# Patient Record
Sex: MALE
[De-identification: names, ages, dates, MRNs, and addresses within clinical notes are randomized per-mention and may not be internally consistent; named-entity substitution may affect disease eponyms.]

---

## 2022-08-29 ENCOUNTER — HOSPITAL ENCOUNTER (EMERGENCY)
Dept: HOSPITAL 97 - ER | Age: 67
Discharge: HOME | End: 2022-08-29
Payer: COMMERCIAL

## 2022-08-29 VITALS — DIASTOLIC BLOOD PRESSURE: 89 MMHG | OXYGEN SATURATION: 98 % | SYSTOLIC BLOOD PRESSURE: 132 MMHG

## 2022-08-29 VITALS — TEMPERATURE: 97.8 F

## 2022-08-29 DIAGNOSIS — R07.89: Primary | ICD-10-CM

## 2022-08-29 DIAGNOSIS — I10: ICD-10-CM

## 2022-08-29 DIAGNOSIS — F17.210: ICD-10-CM

## 2022-08-29 DIAGNOSIS — E11.9: ICD-10-CM

## 2022-08-29 LAB
ALBUMIN SERPL BCP-MCNC: 3.5 G/DL (ref 3.4–5)
ALP SERPL-CCNC: 93 U/L (ref 45–117)
ALT SERPL W P-5'-P-CCNC: 31 U/L (ref 12–78)
AST SERPL W P-5'-P-CCNC: 17 U/L (ref 15–37)
BUN BLD-MCNC: 16 MG/DL (ref 7–18)
GLUCOSE SERPLBLD-MCNC: 147 MG/DL (ref 74–106)
HCT VFR BLD CALC: 45.8 % (ref 39.6–49)
LIPASE SERPL-CCNC: 113 U/L (ref 73–393)
LYMPHOCYTES # SPEC AUTO: 1.9 K/UL (ref 0.7–4.9)
MCV RBC: 88 FL (ref 80–100)
PMV BLD: 7.6 FL (ref 7.6–11.3)
POTASSIUM SERPL-SCNC: 4 MMOL/L (ref 3.5–5.1)
RBC # BLD: 5.21 M/UL (ref 4.33–5.43)

## 2022-08-29 PROCEDURE — 83690 ASSAY OF LIPASE: CPT

## 2022-08-29 PROCEDURE — 36415 COLL VENOUS BLD VENIPUNCTURE: CPT

## 2022-08-29 PROCEDURE — 81015 MICROSCOPIC EXAM OF URINE: CPT

## 2022-08-29 PROCEDURE — 76377 3D RENDER W/INTRP POSTPROCES: CPT

## 2022-08-29 PROCEDURE — 80053 COMPREHEN METABOLIC PANEL: CPT

## 2022-08-29 PROCEDURE — 81003 URINALYSIS AUTO W/O SCOPE: CPT

## 2022-08-29 PROCEDURE — 74176 CT ABD & PELVIS W/O CONTRAST: CPT

## 2022-08-29 PROCEDURE — 85025 COMPLETE CBC W/AUTO DIFF WBC: CPT

## 2022-08-29 PROCEDURE — 99285 EMERGENCY DEPT VISIT HI MDM: CPT

## 2022-08-29 PROCEDURE — 96375 TX/PRO/DX INJ NEW DRUG ADDON: CPT

## 2022-08-29 PROCEDURE — 96374 THER/PROPH/DIAG INJ IV PUSH: CPT

## 2022-08-29 NOTE — RAD REPORT
EXAM DESCRIPTION:  CT - Stone Protocol - 8/29/2022 6:56 pm

 

CLINICAL HISTORY:  right flank pain

 

COMPARISON:  <Comparisons>

 

TECHNIQUE:  Axial 3 mm thick images were obtained without oral or IV contrast. The field-of-view span
s the entirety of the  system including uppermost abdomen and lung bases.

 

All CT scans are performed using dose optimization technique as appropriate and may include automated
 exposure control or mA/KV adjustment according to patient size.

 

FINDINGS:  No hydronephrosis is present and no obstructing ureteral calculi. 1-2 mm sized calculi are
 seen lower pole right kidney. No suspicious renal masses. Isodense masses and pyelonephritis are not
 excluded on a stone protocol CT scan. No significant adrenal finding. No urinary bladder suspicious 
finding.

 

Imaged portions of the liver, spleen and pancreas show no suspicious findings on non-contrast imaging
. No gallbladder or biliary tree abnormality identified.

 

No suspicious bowel findings.

 

No mass or bulky lymphadenopathy. Fat extends into the left inguinal canal. Periumbilical fat only he
rnia present. No free air, free fluid or inflammatory stranding.

 

No significant bony abnormality.

 

IMPRESSION:  No hydronephrosis, obstructing calculus or acute  finding identifiable.

 

Isodense masses and pyelonephritis are not excluded on stone protocol technique.

## 2022-08-29 NOTE — EDPHYS
Physician Documentation                                                                           

 [facility]                                                                 

Name: Andrew Sewell                                                                               

Age: 67 yrs                                                                                       

Sex: Male                                                                                         

: 1955                                                                                   

MRN: [id number]                                                                                   

Arrival Date: 2022                                                                          

Time: 17:40                                                                                       

Account#: [id number]                                                                            

Bed 10                                                                                            

Private MD:                                                                                       

ED Physician Eduardo Salomon                                                                         

HPI:                                                                                              

                                                                                             

18:30 This 67 yrs old  Male presents to ER via Ambulatory with complaints of Flank    cp  

      Pain.                                                                                       

18:30 The patient complains of pain in the right flank. The pain does not radiate. Onset: The cp  

      symptoms/episode began/occurred 2 day(s) ago. Modifying factors: the symptoms are           

      aggravated by movement, palpation/percussion. Associated signs and symptoms: The            

      patient has no apparent associated signs or symptoms. Severity of pain: in the              

      emergency department the pain is unchanged despite home interventions.                      

                                                                                                  

Historical:                                                                                       

- Allergies:                                                                                      

18:04 No Known Allergies;                                                                     aa5 

- PMHx:                                                                                           

18:04 Hypertensive disorder; Diabetes mellitus; thyroid problems;                             aa5 

- PSHx:                                                                                           

18:04 None;                                                                                   aa5 

                                                                                                  

- Immunization history:: Adult Immunizations unknown.                                             

- Social history:: Smoking status: Patient reports the use of cigarette tobacco                   

  products, smokes one-half pack cigarettes per day.                                              

                                                                                                  

                                                                                                  

ROS:                                                                                              

18:35 Constitutional: Negative for body aches, chills, fever, poor PO intake.                 cp  

18:35 Eyes: Negative for injury, pain, redness, and discharge.                                cp  

18:35 ENT: Negative for drainage from ear(s), ear pain, sore throat, difficulty swallowing,       

      difficulty handling secretions.                                                             

18:35 Cardiovascular: Negative for chest pain, edema, palpitations.                               

18:35 Respiratory: Negative for cough, shortness of breath, wheezing.                             

18:35 Abdomen/GI: Negative for vomiting, diarrhea, constipation.                                  

18:35 Back: Positive for flank pain, on the right, Negative for injury or acute deformity,        

      decreased range of motion.                                                                  

18:35 : Negative for urinary symptoms, testicular pain                                          

18:35 Skin: Negative for cellulitis, rash.                                                        

18:35 Neuro: Negative for altered mental status, headache, weakness.                              

18:35 All other systems are negative.                                                             

                                                                                                  

Exam:                                                                                             

18:40 Constitutional: The patient appears in no acute distress, alert, awake,                 cp  

      non-diaphoretic, non-toxic, well developed, well nourished.                                 

18:40 Head/Face:  Normocephalic, atraumatic.                                                  cp  

18:40 Eyes: Periorbital structures: appear normal, Conjunctiva: normal, no exudate, no            

      injection, Sclera: no appreciated abnormality, Lids and lashes: appear normal,              

      bilaterally.                                                                                

18:40 ENT: External ear(s): are unremarkable, Nose: is normal, Mouth: Lips: moist, Oral           

      mucosa: moist, Posterior pharynx: Airway: no evidence of obstruction, patent.               

18:40 Neck: ROM/movement: is normal, is supple, without pain, no range of motions                 

      limitations, no nuchal rigidity.                                                            

18:40 Chest/axilla: Inspection: normal, Palpation: crepitus, is not appreciated, tenderness,      

      that is moderate, of the  right lower lateral chest.                                        

18:40 Cardiovascular: Rate: normal, Edema: is not appreciated, JVD: is not appreciated.           

18:40 Respiratory: the patient does not display signs of respiratory distress,  Respirations:     

      normal, no use of accessory muscles, no retractions, labored breathing, is not present,     

      Breath sounds: are clear throughout, no decreased breath sounds, no stridor, no             

      wheezing.                                                                                   

18:40 Abdomen/GI: Inspection: obese Bowel sounds: active, all quadrants, Palpation: abdomen       

      is soft and non-tender, in all quadrants.                                                   

18:40 Back: CVA tenderness, is absent, vertebral tenderness, is not appreciated.                  

18:40 Skin: cellulitis, is not appreciated, no rash present.                                      

18:40 Neuro: Orientation: to person, place \T\ time. Mentation: is normal, Motor: moves all       

      fours, strength is normal, Sensation: is normal.                                            

                                                                                                  

Vital Signs:                                                                                      

18:05  / 92; Pulse 58; Resp 16 S; Temp 97.8(TE); Pulse Ox 100% on R/A;                  aa5 

19:39  / 90; Pulse 53; Resp 18; Pulse Ox 99% on R/A; Pain 8/10;                         ld1 

20:20  / 89; Pulse 55; Resp 18; Pulse Ox 98% on R/A; Pain 4/10;                         ld1 

                                                                                                  

MDM:                                                                                              

18:18 Patient medically screened.                                                             cp  

20:07 ED course: Patient reports similar pain in the past when diagnosed with shingles. Would cp  

      like to start antivirals.                                                                   

20:12 Data reviewed: vital signs, nurses notes, lab test result(s), radiologic studies, CT    cp  

      scan.                                                                                       

20:12 Counseling: I had a detailed discussion with the patient and/or guardian regarding: the cp  

      historical points, exam findings, and any diagnostic results supporting the                 

      discharge/admit diagnosis, lab results, radiology results, the need for outpatient          

      follow up, a family practitioner, to return to the emergency department if symptoms         

      worsen or persist or if there are any questions or concerns that arise at home.             

      Response to treatment: the patient's symptoms have mildly improved after treatment, and     

      as a result, I will discharge patient.                                                      

                                                                                                  

                                                                                             

18:23 Order name: CBC with Diff; Complete Time: 19:56                                           

                                                                                             

19:56 Interpretation: Reviewed.                                                                 

                                                                                             

18:23 Order name: CMP; Complete Time: 19:56                                                   cp  

                                                                                             

19:56 Interpretation: Normal except: ; GLUC 147.                                          

                                                                                             

18:23 Order name: Lipase; Complete Time: 19:56                                                  

                                                                                             

18:23 Order name: Urine Microscopic Only                                                        

                                                                                             

18:23 Order name: CT Stone Protocol; Complete Time: 19:56                                       

                                                                                             

19:57 Interpretation: Report reviewed.                                                          

                                                                                             

20:23 Order name: Urine Dipstick-Ancillary                                                    EDMS

                                                                                             

18:23 Order name: IV Saline Lock; Complete Time: 18:52                                        cp  

                                                                                             

18:23 Order name: Labs collected and sent; Complete Time: 18:52                                 

                                                                                             

18:23 Order name: Urine Dipstick-Ancillary (obtain specimen); Complete Time: 20:25            cp  

                                                                                                  

Administered Medications:                                                                         

18:52 Not Given (Patient Refused): Zofran (Ondansetron) 4 mg IVP once; over 2 minutes         kb3 

19:35 CANCELLED (Physician Discretion): Ketorolac 15 mg IVP once                              cp  

19:47 Drug: fentaNYL (PF) 25 mcg Route: IVP; Site: right antecubital;                         ld1 

19:47 Follow up: Response: No adverse reaction                                                ld1 

20:19 Drug: Lidoderm Patch 5 % (700 mg/patch) 1 patches Route: Topical; Site: affected area;  ld1 

20:19 Follow up: Response: No adverse reaction                                                ld1 

20:19 Follow up: Response: No adverse reaction                                                ld1 

20:19 Drug: Ketorolac 15 mg Route: IVP; Site: right antecubital;                              ld1 

20:19 Follow up: Response: No adverse reaction                                                ld1 

                                                                                                  

                                                                                                  

Disposition Summary:                                                                              

22 20:12                                                                                    

Discharge Ordered                                                                                 

      Location: Home                                                                          cp  

      Problem: new                                                                            cp  

      Symptoms: have improved                                                                 cp  

      Condition: Stable                                                                       cp  

      Diagnosis                                                                                   

        - Chest pain, unspecified - right lower lateral chest                                 cp  

      Followup:                                                                               cp  

        - With: Private Physician                                                                  

        - When: 2 - 3 days                                                                         

        - Reason: Recheck today's complaints                                                       

      Discharge Instructions:                                                                     

        - Discharge Summary Sheet                                                             cp  

        - Musculoskeletal Pain                                                                cp  

      Forms:                                                                                      

        - Medication Reconciliation Form                                                      cp  

        - Thank You Letter                                                                    cp  

        - Antibiotic Education                                                                cp  

        - Prescription Opioid Use                                                             cp  

      Prescriptions:                                                                              

        - capsaicin 0.075 % Topical cream                                                          

            - apply 1 application by TOPICAL route 4 times per day; 45 gram; Refills: 0,      cp  

      Product Selection Permitted                                                                 

        - Cyclobenzaprine 10 mg Oral Tablet                                                        

            - take 1 tablet by ORAL route every 8 hours As needed; 20 tablet; Refills: 0,     cp  

      Product Selection Permitted                                                                 

        - Acyclovir 800 mg Oral Tablet                                                             

            - take 1 tablet by ORAL route 5 times per day for 10 days; 50 tablet; Refills: 0, cp  

      Product Selection Permitted                                                                 

        - Diclofenac Sodium 75 mg Oral tablet,delayed release (DR/EC)                              

            - take 1 tablet by ORAL route 2 times per day; 20 tablet; Refills: 0, Product     cp  

      Selection Permitted                                                                         

Addendum:                                                                                         

2022                                                                                        

     23:59 Co-signature as Attending Physician, Eduardo Salomon MD I was immediately available on-site r
n

           in the Emergency Department for consultation in the care of the patient..              

                                                                                                  

Signatures:                                                                                       

Dispatcher [employer]                           EDEduardo Hansen MD MD rn Calderon, Audri, RN                     RN   aa5                                                  

Jeevan Hill PA                         PA   cp                                                   

Kelly Lynn RN                     RN   ld1                                                  

Tiffanie Ordonez RN   kb3                                                  

                                                                                                  

Corrections: (The following items were deleted from the chart)                                    

                                                                                             

19:35 19:35 Ketorolac 15 mg IVP once ordered. cp                                              cp  

                                                                                                  

**************************************************************************************************

## 2022-08-29 NOTE — ER
Nurse's Notes                                                                                     

 [facility]                                                                 

Name: Andrew Sewell                                                                               

Age: 67 yrs                                                                                       

Sex: Male                                                                                         

: 1955                                                                                   

MRN: [id number]                                                                                   

Arrival Date: 2022                                                                          

Time: 17:40                                                                                       

Account#: [id number]                                                                            

Bed 10                                                                                            

Private MD:                                                                                       

Diagnosis: Chest pain, unspecified-right lower lateral chest                                      

                                                                                                  

Presentation:                                                                                     

                                                                                             

18:05 Chief complaint: Patient states: right flank pain x 1 week ago. Denies                  aa5 

      nausea/vomiting/diarrhea, denies urinary symptoms. Coronavirus screen: At this time,        

      the client does not indicate any symptoms associated with coronavirus-19. Ebola Screen:     

      Patient denies travel to an Ebola-affected area in the 21 days before illness onset.        

      Initial Sepsis Screen: Does the patient meet any 2 criteria? No. Patient's initial          

      sepsis screen is negative. Does the patient have a suspected source of infection? No.       

      Patient's initial sepsis screen is negative. Risk Assessment: Do you want to hurt           

      yourself or someone else? Patient reports no desire to harm self or others. Onset of        

      symptoms was 2022.                                                                   

18:05 Method Of Arrival: Ambulatory                                                           aa5 

18:05 Acuity: BRIANNA 3                                                                           aa5 

                                                                                                  

Historical:                                                                                       

- Allergies:                                                                                      

18:04 No Known Allergies;                                                                     aa5 

- PMHx:                                                                                           

18:04 Hypertensive disorder; Diabetes mellitus; thyroid problems;                             aa5 

- PSHx:                                                                                           

18:04 None;                                                                                   aa5 

                                                                                                  

- Immunization history:: Adult Immunizations unknown.                                             

- Social history:: Smoking status: Patient reports the use of cigarette tobacco                   

  products, smokes one-half pack cigarettes per day.                                              

                                                                                                  

                                                                                                  

Screenin:39 Abuse screen: Denies threats or abuse. Denies injuries from another. Nutritional        ld1 

      screening: No deficits noted. Tuberculosis screening: No symptoms or risk factors           

      identified. Fall Risk None identified.                                                      

                                                                                                  

Assessment:                                                                                       

19:39 General: Appears in no apparent distress. uncomfortable, Behavior is calm, cooperative, ld1 

      appropriate for age. Pain: Complains of pain in anterior aspect of right lateral            

      abdomen Pain does not radiate. Pain currently is 8 out of 10 on a pain scale. Quality       

      of pain is described as throbbing, Pain began suddenly. Neuro: Level of Consciousness       

      is awake, alert, obeys commands, Oriented to person, place, time, situation.                

      Cardiovascular: Capillary refill < 3 seconds Patient's skin is warm and dry.                

      Respiratory: Airway is patent Respiratory effort is even, unlabored. GI: Abdomen is         

      flat, non-distended. : No signs and/or symptoms were reported regarding the               

      genitourinary system. EENT: No signs and/or symptoms were reported regarding the EENT       

      system. Derm: No signs and/or symptoms reported regarding the dermatologic system.          

      Musculoskeletal: No signs and/or symptoms reported regarding the musculoskeletal system.    

20:20 Reassessment: Patient appears in no apparent distress at this time. Patient and/or      ld1 

      family updated on plan of care and expected duration. Pain level reassessed.                

                                                                                                  

Vital Signs:                                                                                      

18:05  / 92; Pulse 58; Resp 16 S; Temp 97.8(TE); Pulse Ox 100% on R/A;                  aa5 

19:39  / 90; Pulse 53; Resp 18; Pulse Ox 99% on R/A; Pain 8/10;                         ld1 

20:20  / 89; Pulse 55; Resp 18; Pulse Ox 98% on R/A; Pain 4/10;                         ld1 

                                                                                                  

ED Course:                                                                                        

17:40 Patient arrived in ED.                                                                  am2 

17:42 Jeevan Hill PA is PHCP.                                                                cp  

17:42 Eduardo Salomon MD is Attending Physician.                                                cp  

18:03 Arm band placed on.                                                                     aa5 

18:06 Triage completed.                                                                       aa5 

18:30 Tiffanie Ordonez, RN is Primary Nurse.                                                  kb3 

18:52 Patient moved to CT.                                                                    kb3 

18:52 Inserted saline lock: 20 gauge in right antecubital area, using aseptic technique.      kb3 

      Blood collected.                                                                            

18:58 CT Stone Protocol In Process Unspecified.                                               EDMS

19:39 Patient has correct armband on for positive identification. Placed in gown. Bed in low  ld1 

      position. Call light in reach. Side rails up X2. Cardiac monitor on. Pulse ox on. NIBP      

      on. Door closed. Noise minimized. Warm blanket given.                                       

19:39 No provider procedures requiring assistance completed.                                  ld1 

                                                                                                  

Administered Medications:                                                                         

18:52 Not Given (Patient Refused): Zofran (Ondansetron) 4 mg IVP once; over 2 minutes         kb3 

19:35 CANCELLED (Physician Discretion): Ketorolac 15 mg IVP once                              cp  

19:47 Drug: fentaNYL (PF) 25 mcg Route: IVP; Site: right antecubital;                         ld1 

19:47 Follow up: Response: No adverse reaction                                                ld1 

20:19 Drug: Lidoderm Patch 5 % (700 mg/patch) 1 patches Route: Topical; Site: affected area;  ld1 

20:19 Follow up: Response: No adverse reaction                                                ld1 

20:19 Follow up: Response: No adverse reaction                                                ld1 

20:19 Drug: Ketorolac 15 mg Route: IVP; Site: right antecubital;                              ld1 

20:19 Follow up: Response: No adverse reaction                                                ld1 

                                                                                                  

                                                                                                  

Medication:                                                                                       

19:39 VIS not applicable for this client.                                                     ld1 

                                                                                                  

Outcome:                                                                                          

20:12 Discharge ordered by MD. gallegos  

20:20 Condition: stable                                                                       ld1 

20:59 Patient left the ED.                                                                    mw2 

                                                                                                  

Signatures:                                                                                       

Dispatcher [employer]                           EDMS                                                 

Jesica Gallardo RN                     RN   aa5                                                  

Jeevan Hill PA PA cp Moreno, Amanda                               am2                                                  

Kina Felix                            mw2                                                  

Kelly Lynn RN                     RN   ld1                                                  

Tiffanie Ordonez RN                    RN   kb3                                                  

                                                                                                  

**************************************************************************************************

## 2023-10-23 LAB
BUN BLD-MCNC: 19 MG/DL (ref 7–18)
GLUCOSE SERPLBLD-MCNC: 177 MG/DL (ref 74–106)
POTASSIUM SERPL-SCNC: 3.8 MEQ/L (ref 3.5–5.1)

## 2023-10-24 NOTE — EKG
Test Date:    2023-10-23               Test Time:    12:41:22

Technician:   ALVIN                                     

                                                     

MEASUREMENT RESULTS:                                       

Intervals:                                           

Rate:         60                                     

UT:           182                                    

QRSD:         118                                    

QT:           388                                    

QTc:          388                                    

Axis:                                                

P:            44                                     

UT:           182                                    

QRS:          -30                                    

T:            51                                     

                                                     

INTERPRETIVE STATEMENTS:                                       

                                                     

Normal sinus rhythm

Left axis deviation

Nonspecific intraventricular conduction delay

Abnormal ECG

Compared to ECG 09/27/2005 13:58:00

Left-axis deviation now present

Intraventricular conduction delay now present



Electronically Signed On 10-24-23 11:54:21 CDT by George Red

## 2023-10-27 ENCOUNTER — HOSPITAL ENCOUNTER (OUTPATIENT)
Dept: HOSPITAL 97 - OR | Age: 68
Discharge: HOME | End: 2023-10-27
Attending: OTOLARYNGOLOGY
Payer: COMMERCIAL

## 2023-10-27 VITALS — DIASTOLIC BLOOD PRESSURE: 67 MMHG | OXYGEN SATURATION: 93 % | SYSTOLIC BLOOD PRESSURE: 113 MMHG | TEMPERATURE: 97.8 F

## 2023-10-27 DIAGNOSIS — H68.021: ICD-10-CM

## 2023-10-27 DIAGNOSIS — J31.0: ICD-10-CM

## 2023-10-27 DIAGNOSIS — H65.21: Primary | ICD-10-CM

## 2023-10-27 DIAGNOSIS — H90.A31: ICD-10-CM

## 2023-10-27 DIAGNOSIS — E66.3: ICD-10-CM

## 2023-10-27 DIAGNOSIS — Z72.0: ICD-10-CM

## 2023-10-27 PROCEDURE — 80048 BASIC METABOLIC PNL TOTAL CA: CPT

## 2023-10-27 PROCEDURE — 93005 ELECTROCARDIOGRAM TRACING: CPT

## 2023-10-27 PROCEDURE — 097F8ZZ DILATION OF RIGHT EUSTACHIAN TUBE, VIA NATURAL OR ARTIFICIAL OPENING ENDOSCOPIC: ICD-10-PCS

## 2023-10-27 PROCEDURE — 69705 NPS SURG DILAT EUST TUBE UNI: CPT

## 2023-10-27 PROCEDURE — 69436 CREATE EARDRUM OPENING: CPT

## 2023-10-27 PROCEDURE — 82947 ASSAY GLUCOSE BLOOD QUANT: CPT

## 2023-10-27 PROCEDURE — 36415 COLL VENOUS BLD VENIPUNCTURE: CPT

## 2023-10-27 PROCEDURE — 099570Z DRAINAGE OF RIGHT MIDDLE EAR WITH DRAINAGE DEVICE, VIA NATURAL OR ARTIFICIAL OPENING: ICD-10-PCS

## 2023-10-27 NOTE — P.OP
Assistant: NONE,NONE


Preoperative diagnosis: Chronic eustachian tube dysfunction, chronic serous 

otitis media right and


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 

on the contralateral 


Primary procedure: Nasopharyngoscopy with dilation of right eustachian tube


Secondary procedure: Right tympanostomy tube placement


Anesthesia: General


Estimated blood loss: None


Specimen: Significant nasopharyngeal edema, cobblestoning of nasal mucosa, right

OME


Findings: Serous otitis media, right


Operative Technique: 


After adequate plane of anesthesia, the right ear was examined under the 

operating microscope with an ear speculum.  Cerumen was removed using a wire 

loop.  The eardrum was examined and was very mildly retracted with a serous, 

light ton appearing middle ear fluid.  A radial incision was made in the 

anterior-inferior quadrant and serous fluid was suctioned from the middle ear 

space.  An alligator was used to position a Paparella type I tube across the 

incision.  Bleeding was minimal





The head of bed was turned 90 degrees.  The 0 degree endoscope was used to 

perform a nasopharyngoscopy and nasal endoscopy.  The nasal mucosa was somewhat 

pale and edematous with clear mucoid fluid and cobblestoning of the nasal 

mucosa.  The nasopharynx was free from any significant ulcer or abnormal ap

pearing tissue.  There was a minimal amount of adenoid tissue and the 

nasopharynx in general had edematous appearing mucosa with complete occlusion of

the right eustachian tube.  The left eustachian tube appeared patent.  

Photodocumentation was obtained.  The Acclarent balloon was passed under 30 

degree rigid endoscopic guidance through the right nasal cavity.  The tip was 

positioned at the superior aspect of the right eustachian tube opening and the 

balloon was carefully advanced into the eustachian tube.  These eustachian tube 

was somewhat shortened and the balloon could only be advanced approximately 70% 

into the lumen.  The balloon was then slowly inflated to 2 carin.  There was no 

significant watermelon, bleeding, or other immediate complications noted and the

pressure was increased to 8 carin of pressure.  This was held for a total of 2 

minutes.  The balloon was then deflated and the balloon carefully withdrawn.  

There was very scant blood at the eustachian tube opening but no evidence of 

significant mucosal injury or other bleeding.  The procedure was concluded and 

the patient was returned to care of anesthesia for awakening extubation in the 

operating room which proceeded without difficulty.  Patient was brought to the 

recovery room in stable condition.





Complications: None


Implants: Paparella type I, right


Fluids & blood products: See anesthesia record


Transferred to: Recovery Room


Condition: Good